# Patient Record
(demographics unavailable — no encounter records)

---
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
## 2025-07-29 NOTE — PHYSICAL EXAM
[de-identified] : R hand: Laceration in the 3rd webspace Sensation intact distally Flexor and ext tendons intact Tender 3rd and 5th MC Rotational def SF No rotational def MF  Todays Xrays PA/Lateral/Oblique of the R hand shows displaced 5th MC neck; 3rd MC head fracture
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
